# Patient Record
Sex: FEMALE | Race: WHITE
[De-identification: names, ages, dates, MRNs, and addresses within clinical notes are randomized per-mention and may not be internally consistent; named-entity substitution may affect disease eponyms.]

---

## 2018-04-16 ENCOUNTER — HOSPITAL ENCOUNTER (OUTPATIENT)
Dept: HOSPITAL 59 - SUR | Age: 53
Discharge: HOME | End: 2018-04-16
Attending: OBSTETRICS & GYNECOLOGY
Payer: COMMERCIAL

## 2018-04-16 DIAGNOSIS — N95.0: Primary | ICD-10-CM

## 2018-04-16 PROCEDURE — 81025 URINE PREGNANCY TEST: CPT

## 2018-04-16 PROCEDURE — 58555 HYSTEROSCOPY DX SEP PROC: CPT

## 2018-04-16 PROCEDURE — 58120 DILATION AND CURETTAGE: CPT

## 2018-04-16 PROCEDURE — 00952 ANES VAG PX HYSTSC&/HSG: CPT

## 2018-04-16 NOTE — OPERATIVE NOTE
DATE OF SURGERY:   04/16/2018



PREOPERATIVE DIAGNOSIS: Postmenopausal bleeding. 



POSTOPERATIVE DIAGNOSIS: Postmenopausal bleeding. 



OPERATION: Diagnostic hysteroscopy D&C. 



Surgeon: Princess Norris DO



Anesthesia: General. 



COMPLICATIONS: None. 



Estimated Blood Loss: Minimal. 



PROCEDURE: The patient was brought back to the room. She was placed under general anesthesia. She 
was sterilely prepped and draped in the dorsal lithotomy position. A weighted speculum was placed in 
the vaginal vault. A single-tooth tenaculum was used to  the anterior lip of the cervix. The 
cervix was serially dilated to 14-[place]. The patient hysteroscope was then placed. An atrophic 
lining was noted. Both tubal ostia were seen. The hysteroscope was then removed. Curettings were 
done with minimal amount of tissue. There was no abnormality that was noted. The tenaculum was 
removed. The weighted speculum was removed. The patient was then awoken from general anesthesia and 
brought back to recovery room in satisfactory condition. 
CLAUS